# Patient Record
Sex: FEMALE | Race: WHITE | Employment: OTHER | URBAN - METROPOLITAN AREA
[De-identification: names, ages, dates, MRNs, and addresses within clinical notes are randomized per-mention and may not be internally consistent; named-entity substitution may affect disease eponyms.]

---

## 2017-10-19 ENCOUNTER — TRANSFERRED RECORDS (OUTPATIENT)
Dept: HEALTH INFORMATION MANAGEMENT | Facility: CLINIC | Age: 42
End: 2017-10-19

## 2017-10-19 ENCOUNTER — MEDICAL CORRESPONDENCE (OUTPATIENT)
Dept: HEALTH INFORMATION MANAGEMENT | Facility: CLINIC | Age: 42
End: 2017-10-19

## 2017-12-15 NOTE — TELEPHONE ENCOUNTER
Records Received From: [facility]    Date/Exam/Location  (specify location if different)   Office Notes: 10/19/17   Radiology Reports: XR Tailbone 10/19/17

## 2017-12-15 NOTE — TELEPHONE ENCOUNTER
Received Imaging From: [facility]    Image Type (x): Disc:_x__  Pacs:___      Exam Date/Name: XR Tailbone 10/19/17  MRI Pelvis 11/10/17 Comments: disc sent to [facility] via

## 2017-12-15 NOTE — TELEPHONE ENCOUNTER
APPT INFO    Date /Time: 3/15/18 10AM   Reason for Appt: Coccyndia   Ref Provider/Clinic: Dr. Winston Thakur   Are there internal records? Yes/No?  IF YES, list clinic names: NO   Are there outside records? Yes/No? Yes -- see below   Patient Contact (Y/N) & Call Details: No - pt is referred   Action: Records received -- forwarded to [facility]     OUTSIDE RECORDS CHECKLIST     CLINIC NAME COMMENTS REC (x) IMG (x)   [facility]  x x

## 2018-03-15 ENCOUNTER — PRE VISIT (OUTPATIENT)
Dept: ORTHOPEDICS | Facility: CLINIC | Age: 43
End: 2018-03-15

## 2018-08-20 ASSESSMENT — ENCOUNTER SYMPTOMS
SNORES LOUDLY: 1
MUSCLE CRAMPS: 1

## 2018-08-21 ENCOUNTER — HEALTH MAINTENANCE LETTER (OUTPATIENT)
Age: 43
End: 2018-08-21

## 2018-08-22 DIAGNOSIS — M53.3 PAIN IN THE COCCYX: Primary | ICD-10-CM

## 2018-08-23 ENCOUNTER — RADIANT APPOINTMENT (OUTPATIENT)
Dept: GENERAL RADIOLOGY | Facility: CLINIC | Age: 43
End: 2018-08-23
Attending: ORTHOPAEDIC SURGERY

## 2018-08-23 ENCOUNTER — OFFICE VISIT (OUTPATIENT)
Dept: ORTHOPEDICS | Facility: CLINIC | Age: 43
End: 2018-08-23

## 2018-08-23 VITALS — BODY MASS INDEX: 33.84 KG/M2 | WEIGHT: 215.6 LBS | HEIGHT: 67 IN

## 2018-08-23 DIAGNOSIS — M53.3 PAIN IN THE COCCYX: Primary | ICD-10-CM

## 2018-08-23 DIAGNOSIS — M53.3 PAIN IN THE COCCYX: ICD-10-CM

## 2018-08-23 NOTE — MR AVS SNAPSHOT
"              After Visit Summary   8/23/2018    Reinae J Bollschweiler    MRN: [id number]           Patient Information     Date Of Birth          1975        Visit Information        Provider Department      8/23/2018 1:00 PM Tk Zuniga MD [facility]        Today's Diagnoses     Pain in the coccyx    -  1       Follow-ups after your visit        Who to contact     Please call your clinic at [phone] to:    Ask questions about your health    Make or cancel appointments    Discuss your medicines    Learn about your test results    Speak to your doctor            Additional Information About Your Visit        MyChart Information     [employer] gives you secure access to your electronic health record. If you see a primary care provider, you can also send messages to your care team and make appointments. If you have questions, please call your primary care clinic.  If you do not have a primary care provider, please call [phone] and they will assist you.      [employer] is an electronic gateway that provides easy, online access to your medical records. With [employer], you can request a clinic appointment, read your test results, renew a prescription or communicate with your care team.     To access your existing account, please contact your [facility] or call [phone] for assistance.        Care EveryWhere ID     This is your Care EveryWhere ID. This could be used by other organizations to access your [place] medical records  [id number]        Your Vitals Were     Height BMI (Body Mass Index)                1.71 m (5' 7.32\") 33.44 kg/m2           Blood Pressure from Last 3 Encounters:   No data found for BP    Weight from Last 3 Encounters:   08/23/18 97.8 kg (215 lb 9.6 oz)              Today, you had the following     No orders found for display       Primary Care Provider    None Specified       No primary provider on file.        Equal Access to " Services     [facility]: Hadii luna Pagan, wamirda luqadaha, qaybta kaklevermitchell honeycutt, zackary joseph. So [facility] [phone].    ATENCIÓN: Si habla español, tiene a valladares disposición servicios gratuitos de asistencia lingüística. Llame al [phone].    We comply with applicable federal civil rights laws and [place] laws. We do not discriminate on the basis of race, color, national origin, age, disability, sex, sexual orientation, or gender identity.            Thank you!     Thank you for choosing [facility]  for your care. Our goal is always to provide you with excellent care. Hearing back from our patients is one way we can continue to improve our services. Please take a few minutes to complete the written survey that you may receive in the mail after your visit with us. Thank you!             Your Updated Medication List - Protect others around you: Learn how to safely use, store and throw away your medicines at www.disposemymeds.org.      Notice  As of 8/23/2018 11:59 PM    You have not been prescribed any medications.

## 2018-08-23 NOTE — LETTER
8/23/2018       RE: Reinae J Bollschweiler  [address]  [facility] [address]     Dear Colleague,    Thank you for referring your patient, Reinae J Bollschweiler, to the [facility]. Please see a copy of my visit note below.    Chief complaint: Coccyx pain    History of present illness: The patient is a 43-year-old female with past medical history including hypothyroidism presenting for evaluation of coccyx pain ×5 years.  The patient reports that it first started during her first pregnancy in 2013.  The pain got worse throughout her pregnancy but she felt that if she waited until after she delivered this would resolve.  She tried conservative measures for the year following her first child's delivery but unfortunately pain only got worse.  To complicate the picture she then slipped on a slide falling directly onto her bottom which aggravated her pain.  She points her coccyx as a source of her pain.  This is aggravated with sitting in certain positions particularly for long periods.  She drove from [place] today and is having significant pain as a result.  She does note that it sometimes radiates to her low back but does not radiate down her legs.  She denies any numbness or tingling or motor dysfunction or weakness distally.  She has no pain with walking or standing.  She takes ibuprofen for pain relief.  She is undergone at least for x-ray guided coccyx injections.  Her first injection provided with 100% pain relief for greater than 3 months.  Subsequent injections have provided her with 80% pain relief and last several months.  He does note that her sister has a history of a coccygectomy for coccydynia.    Past medical history:  1.  Hypothyroidism    Past surgical history:  1.  Cholecystectomy  2.  Hernia repair  3.  Knee arthroscopy    Medications:  1.  Levothyroxine  2.  Ibuprofen as needed    Allergies: No known drug allergies    Family  history: Patient denies family history of problems of bleeding, clotting or anesthesia.    Social history: The patient lives near [place], [place].  She lives with her  and children in her home.  She is a stay-at-home mom.  She is a never smoker.  Denies alcohol or illicit drug use.    Review of systems: 10 point review of systems is performed and negative other than that noted in the HPI and at the end of the note.    Physical exam:  Visual analog pain scale 4  Oswestry questionnaire 12  General: No acute distress, pleasant, cooperative with exam.  HEENT: Normocephalic atraumatic.  Cardio: Extremities warm and well-perfused.  Respiratory: Nonlabored breathing on room air.  Neuro: Patient moves all extremities spontaneously.  Psych: Appropriate affect.  Musculoskeletal: Upon standing the patient has no significant coronal or sagittal malalignment.  Sensation intact light touch in the L3 through S1 nerve distributions.  Patient fires EHL, ankle dorsi and plantar flexion, knee extension, hip flexion with 5 out of 5 strength.  She is nontender to palpation of the paraspinous muscles nor the spinous processes of the cervical thoracic or lumbar spine.  She is tender to palpation of her coccyx externally.  With the rectal examination, the patient is nontender to palpation of her sacrum but does have significant reproduction of her symptoms with stress of the coccyx.  Of note upon the rectal examination, her coccyx was also found to be posteriorly subluxated.  She has a negative GHAZAL, thigh thrust, sacral thrust.  She is nontender palpation of her bilateral greater trochanters.  She is nontender palpation of her bilateral sacroiliac joints.    Imaging:    X-rays of the patient's sacrum obtained today when she is sitting as compared to those obtained on October 19, 2017 when she is standing reveal 100% posterior subluxation of the coccyx to the remainder of the sacrum when sitting.    An MRI of the sacrum  obtained on November 10, 2017 is reviewed and reveals no evidence of significant degenerative disease or bony abnormality.    Assessment:   Coccydnia    Plan: We had a long discussion with the patient regarding her physical exam as well as clinical findings.  We discussed the diagnosis of coccydynia and the different treatment options to include lifestyle modification, over-the-counter medications, corticosteroid injections and finally coccygectomy.  She is exhausted all of her nonoperative management considerations and given her 100% posterior subluxation of the coccyx on a sitting x-ray as well as significant reproduction of her symptoms upon physical exam maneuvers today feel that she is well indicated for a coccygectomy.  We had a long discussion regarding the risks benefits and alternatives with particular attention paid to the risk of wound complications including infection.  She understands the limitations following the surgery and is interested in moving forward.  This will be scheduled at her convenience.  All questions were answered.    The patient was seen and discussed with Dr. Zuniga was in agreement the above assessment and plan.    Yaneth Mukherjee MD  Orthopedic Surgery Resident, PGY-4    I saw and evaluated the patient on the day of the visit and I formulated the plan.    Again, thank you for allowing me to participate in the care of your patient.      Sincerely,    Tk Zuniga MD

## 2018-08-23 NOTE — NURSING NOTE
"Reason For Visit:   Chief Complaint   Patient presents with     Pain     Coccydina. Referral from Melisa Thakur       Primary MD: No primary care provider on file.  Ref. MD: Dr. Thakur     ?  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  Work status?  Full time.  Date of injury: Pt states she doesn't remember ever injuring it. Pain started in 2013 when she was pregnant. Pain has gotten worse over time. 2015 She took a fall right onto her coccyx which increased pain even more.   Date of surgery: Previous History of Injections.   Type of surgery: NA  Smoker: No  Request smoking cessation information: No    Ht 1.71 m (5' 7.32\")  Wt 97.8 kg (215 lb 9.6 oz)  BMI 33.44 kg/m2    Pain Assessment  Patient Currently in Pain: Yes  Primary Pain Location: Coccyx  Pain Orientation: Right  Pain Descriptors: Aching, Sharp, Dull (pain comes and goes)  Alleviating Factors: Cartico steroid, NSAIDS (Last injection was in march. patient states these injections last 6-8 months. Injections help with pain. )  Aggravating Factors: Sitting, Standing, Bending, Squatting    Oswestry (DEANNE) Questionnaire    OSWESTRY DISABILITY INDEX 8/20/2018   Count 10   Sum 6   Oswestry Score (%) 12        Visual Analog Pain Scale  Back Pain Scale 0-10: 4  Right leg pain: 2.5  Left leg pain: 0    Promis 10 Assessment    PROMIS 10 8/20/2018   In general, would you say your health is: Good   In general, would you say your quality of life is: Good   In general, how would you rate your physical health? Good   In general, how would you rate your mental health, including your mood and your ability to think? Good   In general, how would you rate your satisfaction with your social activities and relationships? Good   In general, please rate how well you carry out your usual social activities and roles Good   To what extent are you able to carry out your everyday physical activities such as walking, climbing stairs, carrying groceries, or moving a chair? " Moderately   How often have you been bothered by emotional problems such as feeling anxious, depressed or irritable? Rarely   How would you rate your fatigue on average? Moderate   How would you rate your pain on average?   0 = No Pain  to  10 = Worst Imaginable Pain 2   In general, would you say your health is: 3   In general, would you say your quality of life is: 3   In general, how would you rate your physical health? 3   In general, how would you rate your mental health, including your mood and your ability to think? 3   In general, how would you rate your satisfaction with your social activities and relationships? 3   In general, please rate how well you carry out your usual social activities and roles. (This includes activities at home, at work and in your community, and responsibilities as a parent, child, spouse, employee, friend, etc.) 3   To what extent are you able to carry out your everyday physical activities such as walking, climbing stairs, carrying groceries, or moving a chair? 3   In the past 7 days, how often have you been bothered by emotional problems such as feeling anxious, depressed, or irritable? 2   In the past 7 days, how would you rate your fatigue on average? 3   In the past 7 days, how would you rate your pain on average, where 0 means no pain, and 10 means worst imaginable pain? 2   Global Mental Health Score 13   Global Physical Health Score 13   PROMIS TOTAL - SUBSCORES 26     Yaneth Shore ATC

## 2018-08-23 NOTE — PROGRESS NOTES
Chief complaint: Coccyx pain    History of present illness: The patient is a 43-year-old female with past medical history including hypothyroidism presenting for evaluation of coccyx pain ×5 years.  The patient reports that it first started during her first pregnancy in 2013.  The pain got worse throughout her pregnancy but she felt that if she waited until after she delivered this would resolve.  She tried conservative measures for the year following her first child's delivery but unfortunately pain only got worse.  To complicate the picture she then slipped on a slide falling directly onto her bottom which aggravated her pain.  She points her coccyx as a source of her pain.  This is aggravated with sitting in certain positions particularly for long periods.  She drove from [place] today and is having significant pain as a result.  She does note that it sometimes radiates to her low back but does not radiate down her legs.  She denies any numbness or tingling or motor dysfunction or weakness distally.  She has no pain with walking or standing.  She takes ibuprofen for pain relief.  She is undergone at least for x-ray guided coccyx injections.  Her first injection provided with 100% pain relief for greater than 3 months.  Subsequent injections have provided her with 80% pain relief and last several months.  He does note that her sister has a history of a coccygectomy for coccydynia.    Past medical history:  1.  Hypothyroidism    Past surgical history:  1.  Cholecystectomy  2.  Hernia repair  3.  Knee arthroscopy    Medications:  1.  Levothyroxine  2.  Ibuprofen as needed    Allergies: No known drug allergies    Family history: Patient denies family history of problems of bleeding, clotting or anesthesia.    Social history: The patient lives near [place], [place].  She lives with her  and children in her home.  She is a stay-at-home mom.  She is a never smoker.  Denies alcohol or illicit drug  use.    Review of systems: 10 point review of systems is performed and negative other than that noted in the HPI and at the end of the note.    Physical exam:  Visual analog pain scale 4  Oswestry questionnaire 12  General: No acute distress, pleasant, cooperative with exam.  HEENT: Normocephalic atraumatic.  Cardio: Extremities warm and well-perfused.  Respiratory: Nonlabored breathing on room air.  Neuro: Patient moves all extremities spontaneously.  Psych: Appropriate affect.  Musculoskeletal: Upon standing the patient has no significant coronal or sagittal malalignment.  Sensation intact light touch in the L3 through S1 nerve distributions.  Patient fires EHL, ankle dorsi and plantar flexion, knee extension, hip flexion with 5 out of 5 strength.  She is nontender to palpation of the paraspinous muscles nor the spinous processes of the cervical thoracic or lumbar spine.  She is tender to palpation of her coccyx externally.  With the rectal examination, the patient is nontender to palpation of her sacrum but does have significant reproduction of her symptoms with stress of the coccyx.  Of note upon the rectal examination, her coccyx was also found to be posteriorly subluxated.  She has a negative GHAZAL, thigh thrust, sacral thrust.  She is nontender palpation of her bilateral greater trochanters.  She is nontender palpation of her bilateral sacroiliac joints.    Imaging:    X-rays of the patient's sacrum obtained today when she is sitting as compared to those obtained on October 19, 2017 when she is standing reveal 100% posterior subluxation of the coccyx to the remainder of the sacrum when sitting.    An MRI of the sacrum obtained on November 10, 2017 is reviewed and reveals no evidence of significant degenerative disease or bony abnormality.    Assessment:   Coccydnia    Plan: We had a long discussion with the patient regarding her physical exam as well as clinical findings.  We discussed the diagnosis of  coccydynia and the different treatment options to include lifestyle modification, over-the-counter medications, corticosteroid injections and finally coccygectomy.  She is exhausted all of her nonoperative management considerations and given her 100% posterior subluxation of the coccyx on a sitting x-ray as well as significant reproduction of her symptoms upon physical exam maneuvers today feel that she is well indicated for a coccygectomy.  We had a long discussion regarding the risks benefits and alternatives with particular attention paid to the risk of wound complications including infection.  She understands the limitations following the surgery and is interested in moving forward.  This will be scheduled at her convenience.  All questions were answered.    The patient was seen and discussed with Dr. Zuniga was in agreement the above assessment and plan.    Yaneth Mukherjee MD  Orthopedic Surgery Resident, PGY-4    I saw and evaluated the patient on the day of the visit and I formulated the plan.  Tk Zuniga MD      Answers for HPI/ROS submitted by the patient on 8/20/2018   General Symptoms: No  Skin Symptoms: No  HENT Symptoms: No  EYE SYMPTOMS: No  HEART SYMPTOMS: No  LUNG SYMPTOMS: Yes  INTESTINAL SYMPTOMS: No  URINARY SYMPTOMS: No  GYNECOLOGIC SYMPTOMS: No  BREAST SYMPTOMS: No  SKELETAL SYMPTOMS: Yes  BLOOD SYMPTOMS: No  NERVOUS SYSTEM SYMPTOMS: No  MENTAL HEALTH SYMPTOMS: No  Snoring: Yes  Muscle cramps: Yes  PHQ-2 Score: Incomplete    Answers for HPI/ROS submitted by the patient on 8/20/2018   General Symptoms: No  Skin Symptoms: No  HENT Symptoms: No  EYE SYMPTOMS: No  HEART SYMPTOMS: No  LUNG SYMPTOMS: Yes  INTESTINAL SYMPTOMS: No  URINARY SYMPTOMS: No  GYNECOLOGIC SYMPTOMS: No  BREAST SYMPTOMS: No  SKELETAL SYMPTOMS: Yes  BLOOD SYMPTOMS: No  NERVOUS SYSTEM SYMPTOMS: No  MENTAL HEALTH SYMPTOMS: No  Snoring: Yes  Muscle cramps: Yes  PHQ-2 Score: Incomplete

## 2018-08-23 NOTE — LETTER
8/23/2018      RE: Marilee CONSTANCE Webberchweiler  [address]  [facility] [address]       Chief complaint: Coccyx pain    History of present illness: The patient is a 43-year-old female with past medical history including hypothyroidism presenting for evaluation of coccyx pain ×5 years.  The patient reports that it first started during her first pregnancy in 2013.  The pain got worse throughout her pregnancy but she felt that if she waited until after she delivered this would resolve.  She tried conservative measures for the year following her first child's delivery but unfortunately pain only got worse.  To complicate the picture she then slipped on a slide falling directly onto her bottom which aggravated her pain.  She points her coccyx as a source of her pain.  This is aggravated with sitting in certain positions particularly for long periods.  She drove from [place] today and is having significant pain as a result.  She does note that it sometimes radiates to her low back but does not radiate down her legs.  She denies any numbness or tingling or motor dysfunction or weakness distally.  She has no pain with walking or standing.  She takes ibuprofen for pain relief.  She is undergone at least for x-ray guided coccyx injections.  Her first injection provided with 100% pain relief for greater than 3 months.  Subsequent injections have provided her with 80% pain relief and last several months.  He does note that her sister has a history of a coccygectomy for coccydynia.    Past medical history:  1.  Hypothyroidism    Past surgical history:  1.  Cholecystectomy  2.  Hernia repair  3.  Knee arthroscopy    Medications:  1.  Levothyroxine  2.  Ibuprofen as needed    Allergies: No known drug allergies    Family history: Patient denies family history of problems of bleeding, clotting or anesthesia.    Social history: The patient lives near [place], [place].  She lives with her  and children in her home.  She is  a stay-at-home mom.  She is a never smoker.  Denies alcohol or illicit drug use.    Review of systems: 10 point review of systems is performed and negative other than that noted in the HPI and at the end of the note.    Physical exam:  Visual analog pain scale 4  Oswestry questionnaire 12  General: No acute distress, pleasant, cooperative with exam.  HEENT: Normocephalic atraumatic.  Cardio: Extremities warm and well-perfused.  Respiratory: Nonlabored breathing on room air.  Neuro: Patient moves all extremities spontaneously.  Psych: Appropriate affect.  Musculoskeletal: Upon standing the patient has no significant coronal or sagittal malalignment.  Sensation intact light touch in the L3 through S1 nerve distributions.  Patient fires EHL, ankle dorsi and plantar flexion, knee extension, hip flexion with 5 out of 5 strength.  She is nontender to palpation of the paraspinous muscles nor the spinous processes of the cervical thoracic or lumbar spine.  She is tender to palpation of her coccyx externally.  With the rectal examination, the patient is nontender to palpation of her sacrum but does have significant reproduction of her symptoms with stress of the coccyx.  Of note upon the rectal examination, her coccyx was also found to be posteriorly subluxated.  She has a negative GHAZAL, thigh thrust, sacral thrust.  She is nontender palpation of her bilateral greater trochanters.  She is nontender palpation of her bilateral sacroiliac joints.    Imaging:    X-rays of the patient's sacrum obtained today when she is sitting as compared to those obtained on October 19, 2017 when she is standing reveal 100% posterior subluxation of the coccyx to the remainder of the sacrum when sitting.    An MRI of the sacrum obtained on November 10, 2017 is reviewed and reveals no evidence of significant degenerative disease or bony abnormality.    Assessment:   Coccydnia    Plan: We had a long discussion with the patient regarding her  physical exam as well as clinical findings.  We discussed the diagnosis of coccydynia and the different treatment options to include lifestyle modification, over-the-counter medications, corticosteroid injections and finally coccygectomy.  She is exhausted all of her nonoperative management considerations and given her 100% posterior subluxation of the coccyx on a sitting x-ray as well as significant reproduction of her symptoms upon physical exam maneuvers today feel that she is well indicated for a coccygectomy.  We had a long discussion regarding the risks benefits and alternatives with particular attention paid to the risk of wound complications including infection.  She understands the limitations following the surgery and is interested in moving forward.  This will be scheduled at her convenience.  All questions were answered.    The patient was seen and discussed with Dr. Zuniga was in agreement the above assessment and plan.    Yaneth Mukherjee MD  Orthopedic Surgery Resident, PGY-4    I saw and evaluated the patient on the day of the visit and I formulated the plan.  Tk Zuniga MD

## 2018-10-05 ENCOUNTER — TELEPHONE (OUTPATIENT)
Dept: ORTHOPEDICS | Facility: CLINIC | Age: 43
End: 2018-10-05

## 2018-10-05 NOTE — TELEPHONE ENCOUNTER
[facility] Call Center    Phone Message    May a detailed message be left on voicemail: no    Reason for Call: Other: Pt saw Dr. Zuniga in August to discuss getting part of her tailbone removed.  Pt was told they would check with her insurance to see if surgery would be covered and pt has not heard anything back.  Pt is calling for a status update please     Action Taken: Message routed to:  [facility] ([facility] Ortho

## 2018-10-09 NOTE — TELEPHONE ENCOUNTER
Phoned patient to discuss scheduling surgery with Dr. Zuniga. Patient was provided several available surgery dates, stated she will check with  and call back with a decision. Patient was provided direct number for surgery scheduling. [phone].

## 2018-11-28 ENCOUNTER — TRANSFERRED RECORDS (OUTPATIENT)
Dept: HEALTH INFORMATION MANAGEMENT | Facility: CLINIC | Age: 43
End: 2018-11-28

## 2018-12-11 ENCOUNTER — DOCUMENTATION ONLY (OUTPATIENT)
Dept: ORTHOPEDICS | Facility: CLINIC | Age: 43
End: 2018-12-11

## 2018-12-11 ENCOUNTER — HOSPITAL ENCOUNTER (OUTPATIENT)
Facility: CLINIC | Age: 43
End: 2018-12-11
Attending: ORTHOPAEDIC SURGERY | Admitting: ORTHOPAEDIC SURGERY
Payer: COMMERCIAL

## 2018-12-11 NOTE — PROGRESS NOTES
Patient is scheduled for surgery with Dr. Zuniga      Spoke or left message with: Patient    Date of Surgery: 12/19/18    Location: [place] (Dr. Zuniga at [place] same day doing another co-surgeon case)    Pre-op with surgeon (if applicable): Complete    H&P: Patient to schedule with PCP    Additional imaging/appointments: N/A    Surgery packet: Received in clinic     Additional comments: N/A

## 2018-12-12 ENCOUNTER — TELEPHONE (OUTPATIENT)
Dept: ORTHOPEDICS | Facility: CLINIC | Age: 43
End: 2018-12-12

## 2018-12-12 NOTE — TELEPHONE ENCOUNTER
Phoned patient to let her know that her surgery with Dr. Zuniga has been moved from the [facility] to the [facility] in order to accommodate Dr. Zuniga's schedule, as he will be involved in another large surgery that day on the [place]. Patient agreed to the change.

## 2018-12-17 RX ORDER — FERROUS SULFATE 324(65)MG
TABLET, DELAYED RELEASE (ENTERIC COATED) ORAL
COMMUNITY
End: 2018-12-18 | Stop reason: HOSPADM

## 2018-12-17 RX ORDER — THYROID 30 MG/1
90 TABLET ORAL DAILY
COMMUNITY
End: 2018-12-18 | Stop reason: HOSPADM

## 2018-12-17 SDOH — HEALTH STABILITY: MENTAL HEALTH: HOW OFTEN DO YOU HAVE A DRINK CONTAINING ALCOHOL?: NEVER

## 2018-12-17 NOTE — OR NURSING
Contacted Hnoey HAILE Re: patient's hemoglobin results from pre-op Hand P and repeat on 12/14/18 (9.6).See labs tab. Honey stated that she would check with Dr. Zuniga and get back to Pre-admission nursing.

## 2018-12-18 ENCOUNTER — TELEPHONE (OUTPATIENT)
Dept: ORTHOPEDICS | Facility: CLINIC | Age: 43
End: 2018-12-18

## 2018-12-18 DIAGNOSIS — M53.3 COCCYDYNIA: Primary | ICD-10-CM

## 2018-12-18 DIAGNOSIS — M53.3 COCCYDYNIA: ICD-10-CM

## 2018-12-18 LAB — HGB BLD-MCNC: 9.6 G/DL (ref 11.7–15.7)

## 2018-12-18 RX ORDER — CEFAZOLIN SODIUM 2 G/100ML
2 INJECTION, SOLUTION INTRAVENOUS
Status: CANCELLED | OUTPATIENT
Start: 2018-12-18

## 2018-12-18 RX ORDER — CEFAZOLIN SODIUM 1 G/3ML
1 INJECTION, POWDER, FOR SOLUTION INTRAMUSCULAR; INTRAVENOUS SEE ADMIN INSTRUCTIONS
Status: CANCELLED | OUTPATIENT
Start: 2018-12-18

## 2018-12-18 NOTE — TELEPHONE ENCOUNTER
PAN called last night about H&P results faxed from primary at home-see scan in epic.  A1C 6.2 started on pre Diabetes work up by primary MD &  Hgb 9.3 started on Iron supp. & rechecked Fri Dec 14 was 9.6.   I called pt. Last night 12-17-18 & she & her  were already driving here from [place] last night.  She  stated Hx. Anemia her whole life & they have never found a cause, even after childbirth.  Stated she had her period on fri. When Hgb rechecked & really wants surgery still done in Dec due to Insurance deductible being  met or else it will be another whole year before she can have the surgery.  I reviewed with  who stated  He would be willing to do the surgery if Hgb 10.0 or above & to be sure pt understands the increased risk of medical complications if Low Hgb.  Pt. Agreed.  Recheck Hgb today here at [facility].    T.O.R.B./Honey Maki RN.    See my previous note of today 12-18-18.  Recheck Hgb today the same 9.6 so surgery canceled for tomorrow.  Informed pt. & again recommended she get referral order from primary MD for consult by hematology at home for workup of cause of chronic Anemia.  I asked pt. To sign CONSTANCE & have imaging & workup sent to us &  Pt. Agreed.  Pt. Knows not to call [facility] surgery scheduler to pick new surgery date until  We get Fax of Hgb 10.0 or higher. Pt agreed.  Notified Charity.  T.O./Honey Maki RN.

## 2019-01-03 ENCOUNTER — TELEPHONE (OUTPATIENT)
Dept: ORTHOPEDICS | Facility: CLINIC | Age: 44
End: 2019-01-03

## 2019-01-03 NOTE — TELEPHONE ENCOUNTER
Patient is calling to inform Honey HAILE RNCC that her PCP will be faxing over her new labs so that she can be scheduled for surgery. I will notify Honey HAILE To watch for that update and to call patient once it arrives. Patient stated that she lives in [place] and would rather not come back for another visit unless she absolutely needs to. She would just like to set up a surgical date as soon as possible.

## 2019-02-18 NOTE — PROGRESS NOTES
REASON FOR CONSULTATION: Consult (Dr. Zuniga pt. Discuss a Coccygectomy)     REFERRING PHYSICIAN: Referred Self   PCP:No primary care provider on file.    History of Present Illness:    Reinae J Bollschweiler is a 43 year old female with a past medical history significant for hypothyroidism, anemia who initially presented to Dr. Zuniga's office for 5 years of coccyx pain.  Of note, her sister has a history of coccygectomy for coccydynia. She reported that she first developed coccyx pain during her first pregnancy in 2013.  The pain worsened throughout the duration of her pregnancy, and continued to increase in severity following the delivery of her child.  Her pain is aggravated with sitting in certain positions particularly for long periods of time.  She denies any numbness/tingling, motor dysfunction or weakness distally.      She was last seen by Dr. Zuniga on 8/23/2018.  During that visit they discussed her imaging which demonstrated 100% posterior subluxation of the coccyx on a standing x-ray, as well as, her physical exam findings including reproduction of her symptoms with physical exam maneuvers.    Patient was initially scheduled for coccygectomy on 12/19/2018 however her procedure was canceled secondary to low hemoglobin.  She is presenting to our clinic today to discuss surgical scheduling as soon as possible.    Dr. Jama from hematology determined that her anemia is secondary to her period and she is now on daily Iron supplement.     Today, she reports that her coccyx pain is manageable secondary to her recent injection.  She does acknowledge that her pain has changed compared to her last visit with Dr. Zuniga.  She reports that previously, her pain would only affect her when she was transitioning from sitting to standing or vice versa.  Unfortunately, now she is beginning to have symptoms with prolonged sitting.  Additionally, her injection is not as effective as it had been in the past.  She denies  "anything outside of the injections that have improved her pain.  She denies any radiation to her legs.    Back 100%, Leg 0%  Worse: prolonged sitting or transitioning from sitting to standing  Better: worse    Conservative treatments:  1.  Medications - ibuprofen   2.  Injections -she reports significant pain relief for several months following coccyx injections. Last injection at [facility], December, 75% improvement.  Patient reports that her injection is still providing her 75% improvement in her pain.  Typically, her injections last for approximately 5 months.      Oswestry (DEANNE) Questionnaire    OSWESTRY DISABILITY INDEX 2/21/2019   Count 8   Sum 6   Oswestry Score (%) 15      ROS:  A 12-point review of systems was completed and is negative except for otherwise noted above in the history of present illness.    Med Hx:  No past medical history on file.    Surg Hx:  Past Surgical History:   Procedure Laterality Date     CHOLECYSTECTOMY       GYN SURGERY      D and C     ORTHOPEDIC SURGERY Left     knee surgery     TONSILLECTOMY         Allergies:  No Known Allergies    Meds:  Current Outpatient Medications   Medication     ferrous sulfate (FEROSUL) 325 (65 Fe) MG tablet     thyroid (ARMOUR THYROID) 120 MG tablet     No current facility-administered medications for this visit.        Fam Hx:  No family history on file.    P/S Hx:  Social History     Tobacco Use     Smoking status: Never Smoker     Smokeless tobacco: Never Used   Substance Use Topics     Alcohol use: No     Frequency: Never         Physical Exam:  Very pleasant, healthy appearing, alert, oriented x 3, cooperative.  Normal mood and affect.  Not in cardiorespiratory distress.  Ht 1.702 m (5' 7\")   Wt 99.8 kg (220 lb)   BMI 34.46 kg/m    Normal upright posture.    Normal gait without assistive device.  No antalgia / imbalance.  Able to walk on toes and on heels with ease.  Back: no deformity, no skin lesions or surgical " scars.  Localizes pain at coccyx  Tenderness: (+) midline, (-) paraspinal, (-) R and L PSIS.    Neuro Exam:  Motor: 5/5 strength for all muscle groups in both LE's.  Sensory:  Intact to light touch in both LE's.   Reflexes:  Knee 2+ bilat.  Ankle 2+ bilat.  (-) Babinski, (-) clonus.    Lower Extremity:  Equal leg lengths, full pulses, (-) atrophy / asymmetry.  Full painless passive knee and ankle motion.  Straight leg raise: (-) right, (-) left.    Imaging:  Reviewed previous sacrum/coccyx lateral x-ray and pelvis MRI, which show clear change in sacrococcygeal joint alignment between the stress sitting x-ray and the supine MRI.  Interestingly, the coccyx is more posteriorly translated on the sitting x-ray and is well-aligned (neutral) on the supine MRI.    Impression:   Chronic coccygodynia, with sacrococcygeal joint instability (documented on imaging) and with positive response to previous multiple sacrococcygeal joint injection.     Plan:   Schedule for coccygectomy   Will follow up on Pre-op hemoglobin in order to prevent a repeat of her prior surgical experience    Barrera Borrego MD  Orthopaedic Surgery, PGY-1    All questions and concerns were answered to the patient's apparent satisfaction before leaving the clinic.     Attestation:  I (Dr. Chandler Hart - Spine Surgeon) have personally evaluated patient with PGY-1 Elmo, and agree with findings and plan outlined in the note.  I discussed at length with the patient/family, explained the nature of spinal condition, and formulated workup and/or treatment plan together.  All questions were answered to the best of my ability and to patient's apparent satisfaction.    TT > 25 mins, > 50% CC.    Respectfully,    Chandler Hart MD    Orthopaedic Spine Surgery  Dept Orthopaedic Surgery, [facility]  [phone] office, [phone] pager  [email or web address]      Answers for HPI/ROS submitted by the patient on 2/21/2019    General Symptoms: No  Skin Symptoms: No  HENT Symptoms: No  EYE SYMPTOMS: No  HEART SYMPTOMS: No  LUNG SYMPTOMS: No  INTESTINAL SYMPTOMS: No  URINARY SYMPTOMS: No  GYNECOLOGIC SYMPTOMS: No  BREAST SYMPTOMS: No  SKELETAL SYMPTOMS: No  BLOOD SYMPTOMS: No  NERVOUS SYSTEM SYMPTOMS: No  MENTAL HEALTH SYMPTOMS: No

## 2019-02-21 ENCOUNTER — PRE VISIT (OUTPATIENT)
Dept: ORTHOPEDICS | Facility: CLINIC | Age: 44
End: 2019-02-21

## 2019-02-21 ENCOUNTER — OFFICE VISIT (OUTPATIENT)
Dept: ORTHOPEDICS | Facility: CLINIC | Age: 44
End: 2019-02-21
Payer: COMMERCIAL

## 2019-02-21 VITALS — BODY MASS INDEX: 34.53 KG/M2 | HEIGHT: 67 IN | WEIGHT: 220 LBS

## 2019-02-21 DIAGNOSIS — M53.3 COCCYGODYNIA: Primary | ICD-10-CM

## 2019-02-21 RX ORDER — THYROID 120 MG/1
TABLET ORAL
COMMUNITY

## 2019-02-21 RX ORDER — FERROUS SULFATE 325(65) MG
325 TABLET ORAL
COMMUNITY
End: 2019-06-05

## 2019-02-21 ASSESSMENT — MIFFLIN-ST. JEOR: SCORE: 1680.54

## 2019-02-21 NOTE — NURSING NOTE
Teaching Flowsheet   Relevant Diagnosis: Coccyx   Teaching Topic: preop     Person(s) involved in teaching:   Patient     Motivation Level:  Asks Questions: Yes  Eager to Learn: Yes  Cooperative: Yes  Receptive (willing/able to accept information): Yes  Any cultural factors/Faith beliefs that may influence understanding or compliance? No       Patient demonstrates understanding of the following:  Reason for the appointment, diagnosis and treatment plan: Yes  Knowledge of proper use of medications and conditions for which they are ordered (with special attention to potential side effects or drug interactions): Yes  Which situations necessitate calling provider and whom to contact: Yes       Teaching Concerns Addressed:        Proper use and care of meds. (medical equip, care aids, etc.): Yes  Nutritional needs and diet plan: Yes  Pain management techniques: Yes  Wound Care: Yes  How and/when to access community resources: Yes     Instructional Materials Used/Given: preop pkt     Time spent with patient: 15 minutes.

## 2019-02-21 NOTE — LETTER
2/21/2019       RE: Reinae J Bollschweiler  [address]  [place] [address]     Dear Colleague,    Thank you for referring your patient, Reinae J Bollschweiler, to [facility]. Please see a copy of my visit note below.    REASON FOR CONSULTATION: Consult (Dr. Zuniga pt. Discuss a Coccygectomy)     REFERRING PHYSICIAN: Referred Self   PCP:No primary care provider on file.    History of Present Illness:    Reinae J Bollschweiler is a 43 year old female with a past medical history significant for hypothyroidism, anemia who initially presented to Dr. Zuniga's office for 5 years of coccyx pain.  Of note, her sister has a history of coccygectomy for coccydynia. She reported that she first developed coccyx pain during her first pregnancy in 2013.  The pain worsened throughout the duration of her pregnancy, and continued to increase in severity following the delivery of her child.  Her pain is aggravated with sitting in certain positions particularly for long periods of time.  She denies any numbness/tingling, motor dysfunction or weakness distally.      She was last seen by Dr. Zuniga on 8/23/2018.  During that visit they discussed her imaging which demonstrated 100% posterior subluxation of the coccyx on a standing x-ray, as well as, her physical exam findings including reproduction of her symptoms with physical exam maneuvers.    Patient was initially scheduled for coccygectomy on 12/19/2018 however her procedure was canceled secondary to low hemoglobin.  She is presenting to our clinic today to discuss surgical scheduling as soon as possible.    Dr. Jama from hematology determined that her anemia is secondary to her period and she is now on daily Iron supplement.     Today, she reports that her coccyx pain is manageable secondary to her recent injection.  She does acknowledge that her pain has changed compared to her last visit with Dr. Zuniga.  She reports  that previously, her pain would only affect her when she was transitioning from sitting to standing or vice versa.  Unfortunately, now she is beginning to have symptoms with prolonged sitting.  Additionally, her injection is not as effective as it had been in the past.  She denies anything outside of the injections that have improved her pain.  She denies any radiation to her legs.    Back 100%, Leg 0%  Worse: prolonged sitting or transitioning from sitting to standing  Better: worse    Conservative treatments:  1.  Medications - ibuprofen   2.  Injections -she reports significant pain relief for several months following coccyx injections. Last injection at [facility], December, 75% improvement.  Patient reports that her injection is still providing her 75% improvement in her pain.  Typically, her injections last for approximately 5 months.    Oswestry (DEANNE) Questionnaire    OSWESTRY DISABILITY INDEX 2/21/2019   Count 8   Sum 6   Oswestry Score (%) 15      ROS:  A 12-point review of systems was completed and is negative except for otherwise noted above in the history of present illness.    Med Hx:  No past medical history on file.    Surg Hx:  Past Surgical History:   Procedure Laterality Date     CHOLECYSTECTOMY       GYN SURGERY      D and C     ORTHOPEDIC SURGERY Left     knee surgery     TONSILLECTOMY         Allergies:  No Known Allergies    Meds:  Current Outpatient Medications   Medication     ferrous sulfate (FEROSUL) 325 (65 Fe) MG tablet     thyroid (ARMOUR THYROID) 120 MG tablet     No current facility-administered medications for this visit.        Fam Hx:  No family history on file.    P/S Hx:  Social History     Tobacco Use     Smoking status: Never Smoker     Smokeless tobacco: Never Used   Substance Use Topics     Alcohol use: No     Frequency: Never       Physical Exam:  Very pleasant, healthy appearing, alert, oriented x 3, cooperative.  Normal mood and affect.  Not in  "cardiorespiratory distress.  Ht 1.702 m (5' 7\")   Wt 99.8 kg (220 lb)   BMI 34.46 kg/m     Normal upright posture.    Normal gait without assistive device.  No antalgia / imbalance.  Able to walk on toes and on heels with ease.  Back: no deformity, no skin lesions or surgical scars.  Localizes pain at coccyx  Tenderness: (+) midline, (-) paraspinal, (-) R and L PSIS.    Neuro Exam:  Motor: 5/5 strength for all muscle groups in both LE's.  Sensory:  Intact to light touch in both LE's.   Reflexes:  Knee 2+ bilat.  Ankle 2+ bilat.  (-) Babinski, (-) clonus.    Lower Extremity:  Equal leg lengths, full pulses, (-) atrophy / asymmetry.  Full painless passive knee and ankle motion.  Straight leg raise: (-) right, (-) left.    Imaging:  Reviewed previous sacrum/coccyx lateral x-ray and pelvis MRI, which show clear change in sacrococcygeal joint alignment between the stress sitting x-ray and the supine MRI.  Interestingly, the coccyx is more posteriorly translated on the sitting x-ray and is well-aligned (neutral) on the supine MRI.    Impression:   Chronic coccygodynia, with sacrococcygeal joint instability (documented on imaging) and with positive response to previous multiple sacrococcygeal joint injection.     Plan:   Schedule for coccygectomy   Will follow up on Pre-op hemoglobin in order to prevent a repeat of her prior surgical experience    Barrera Borrego MD  Orthopaedic Surgery, PGY-1    All questions and concerns were answered to the patient's apparent satisfaction before leaving the clinic.     Attestation:  I (Dr. Chandler Hart - Spine Surgeon) have personally evaluated patient with PGY-1 Elmo, and agree with findings and plan outlined in the note.  I discussed at length with the patient/family, explained the nature of spinal condition, and formulated workup and/or treatment plan together.  All questions were answered to the best of my ability and to patient's apparent satisfaction.    TT > 25 " mins, > 50% CC.    Respectfully,    Chandler Hart MD    Orthopaedic Spine Surgery  Dept Orthopaedic Surgery, [facility]  [phone] office, [phone] pager  [email or web address]

## 2019-02-21 NOTE — NURSING NOTE
"Reason For Visit:   Chief Complaint   Patient presents with     Consult     Dr. Zuniga pt. Discuss a Coccygectomy        Primary MD: No primary care provider on file.  Ref. MD: Dr. Thakur           ?  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  Work status?  Full time.  Date of injury: Pt states she doesn't remember ever injuring it. Pain started in 2013 when she was pregnant. Pain has gotten worse over time. 2015 She took a fall right onto her coccyx which increased pain even more.   Date of surgery: Previous History of Injections.   Type of surgery: NA  Smoker: No    Ht 1.702 m (5' 7\")   Wt 99.8 kg (220 lb)   BMI 34.46 kg/m      Pain Assessment  Patient Currently in Pain: Denies    Oswestry (DEANNE) Questionnaire    OSWESTRY DISABILITY INDEX 2/21/2019   Count 8   Sum 6   Oswestry Score (%) 15        Visual Analog Pain Scale  Back Pain Scale 0-10: 0  Right leg pain: 0  Left leg pain: 0    Promis 10 Assessment    PROMIS 10 2/21/2019   In general, would you say your health is: Good   In general, would you say your quality of life is: Good   In general, how would you rate your physical health? Fair   In general, how would you rate your mental health, including your mood and your ability to think? Good   In general, how would you rate your satisfaction with your social activities and relationships? Good   In general, please rate how well you carry out your usual social activities and roles Good   To what extent are you able to carry out your everyday physical activities such as walking, climbing stairs, carrying groceries, or moving a chair? Moderately   How often have you been bothered by emotional problems such as feeling anxious, depressed or irritable? Never   How would you rate your fatigue on average? Mild   How would you rate your pain on average?   0 = No Pain  to  10 = Worst Imaginable Pain 0   In general, would you say your health is: 3   In general, would you say your quality of life is: 3   In " general, how would you rate your physical health? 2   In general, how would you rate your mental health, including your mood and your ability to think? 3   In general, how would you rate your satisfaction with your social activities and relationships? 3   In general, please rate how well you carry out your usual social activities and roles. (This includes activities at home, at work and in your community, and responsibilities as a parent, child, spouse, employee, friend, etc.) 3   To what extent are you able to carry out your everyday physical activities such as walking, climbing stairs, carrying groceries, or moving a chair? 3   In the past 7 days, how often have you been bothered by emotional problems such as feeling anxious, depressed, or irritable? 1   In the past 7 days, how would you rate your fatigue on average? 2   In the past 7 days, how would you rate your pain on average, where 0 means no pain, and 10 means worst imaginable pain? 0   Global Mental Health Score 14   Global Physical Health Score 14   PROMIS TOTAL - SUBSCORES 28                Nan Del Toro LPN

## 2019-03-19 ENCOUNTER — TELEPHONE (OUTPATIENT)
Dept: ORTHOPEDICS | Facility: CLINIC | Age: 44
End: 2019-03-19

## 2019-03-19 NOTE — TELEPHONE ENCOUNTER
Attempted to reach out to patient to assist with scheduling surgery with Dr. Hart. Left detailed message informing patient that we received prior auth approval and Dr. Pedroza first available would be 4/1, 4/9 or 4/30. Left best call back number of [phone]

## 2019-03-21 ENCOUNTER — HOSPITAL ENCOUNTER (OUTPATIENT)
Facility: AMBULATORY SURGERY CENTER | Age: 44
End: 2019-03-21
Attending: ORTHOPAEDIC SURGERY
Payer: COMMERCIAL

## 2019-03-21 NOTE — TELEPHONE ENCOUNTER
Patient is scheduled for surgery with Dr. Hart      Spoke or left message with: Spoke with Marilee    Date of Surgery: 6/11/19    [facility]    Informed patient they will need an adult  Yes    H&P: Patient to schedule with PCP    Additional imaging/appointments: N/A    Surgery packet: Given in clinic    Additional comments: Patient will await pre admission phone call 1-2 days prior to surgery for arrival time

## 2019-06-07 ENCOUNTER — TELEPHONE (OUTPATIENT)
Dept: ORTHOPEDICS | Facility: CLINIC | Age: 44
End: 2019-06-07

## 2019-06-07 NOTE — TELEPHONE ENCOUNTER
Attempted to reach out to patient to discuss her surgery moving from [facility] to [facility] due to an emergency add on. Left best call back number of [phone]

## 2019-06-09 PROBLEM — Z98.890 S/P BLADDER REPAIR: Status: ACTIVE | Noted: 2019-02-27

## 2019-06-09 PROBLEM — Z90.710 S/P LAPAROSCOPIC HYSTERECTOMY: Status: ACTIVE | Noted: 2019-02-27

## 2019-06-10 ENCOUNTER — ANESTHESIA EVENT (OUTPATIENT)
Dept: SURGERY | Facility: CLINIC | Age: 44
End: 2019-06-10
Payer: COMMERCIAL

## 2019-06-11 ENCOUNTER — ANESTHESIA (OUTPATIENT)
Dept: SURGERY | Facility: CLINIC | Age: 44
End: 2019-06-11
Payer: COMMERCIAL

## 2019-06-11 ENCOUNTER — HOSPITAL ENCOUNTER (OUTPATIENT)
Facility: CLINIC | Age: 44
Discharge: HOME | End: 2019-06-11
Attending: ORTHOPAEDIC SURGERY | Admitting: ORTHOPAEDIC SURGERY
Payer: COMMERCIAL

## 2019-06-11 VITALS
RESPIRATION RATE: 16 BRPM | WEIGHT: 229.94 LBS | HEART RATE: 84 BPM | HEIGHT: 67 IN | BODY MASS INDEX: 36.09 KG/M2 | SYSTOLIC BLOOD PRESSURE: 116 MMHG | TEMPERATURE: 98 F | OXYGEN SATURATION: 95 % | DIASTOLIC BLOOD PRESSURE: 64 MMHG

## 2019-06-11 DIAGNOSIS — M53.3 COCCYGODYNIA: Primary | ICD-10-CM

## 2019-06-11 LAB
ABO + RH BLD: NORMAL
ABO + RH BLD: NORMAL
BLD GP AB SCN SERPL QL: NORMAL
BLOOD BANK CMNT PATIENT-IMP: NORMAL
GLUCOSE BLDC GLUCOMTR-MCNC: 108 MG/DL (ref 70–99)
HGB BLD-MCNC: 14.4 G/DL (ref 11.7–15.7)
SPECIMEN EXP DATE BLD: NORMAL

## 2019-06-11 PROCEDURE — 25000566 ZZH SEVOFLURANE, EA 15 MIN: Performed by: ORTHOPAEDIC SURGERY

## 2019-06-11 PROCEDURE — 25000131 ZZH RX MED GY IP 250 OP 636 PS 637: Performed by: PHYSICIAN ASSISTANT

## 2019-06-11 PROCEDURE — 25800030 ZZH RX IP 258 OP 636: Performed by: ANESTHESIOLOGY

## 2019-06-11 PROCEDURE — 36415 COLL VENOUS BLD VENIPUNCTURE: CPT | Performed by: ANESTHESIOLOGY

## 2019-06-11 PROCEDURE — 25000125 ZZHC RX 250: Performed by: NURSE ANESTHETIST, CERTIFIED REGISTERED

## 2019-06-11 PROCEDURE — 25000128 H RX IP 250 OP 636: Performed by: ANESTHESIOLOGY

## 2019-06-11 PROCEDURE — 25000128 H RX IP 250 OP 636: Performed by: PHYSICIAN ASSISTANT

## 2019-06-11 PROCEDURE — 25000132 ZZH RX MED GY IP 250 OP 250 PS 637: Performed by: PHYSICIAN ASSISTANT

## 2019-06-11 PROCEDURE — 86850 RBC ANTIBODY SCREEN: CPT | Performed by: ANESTHESIOLOGY

## 2019-06-11 PROCEDURE — 85018 HEMOGLOBIN: CPT | Performed by: ANESTHESIOLOGY

## 2019-06-11 PROCEDURE — 36000064 ZZH SURGERY LEVEL 4 EA 15 ADDTL MIN - UMMC: Performed by: ORTHOPAEDIC SURGERY

## 2019-06-11 PROCEDURE — 27210794 ZZH OR GENERAL SUPPLY STERILE: Performed by: ORTHOPAEDIC SURGERY

## 2019-06-11 PROCEDURE — 37000009 ZZH ANESTHESIA TECHNICAL FEE, EACH ADDTL 15 MIN: Performed by: ORTHOPAEDIC SURGERY

## 2019-06-11 PROCEDURE — 71000013 ZZH RECOVERY PHASE 1 LEVEL 1 EA ADDTL HR: Performed by: ORTHOPAEDIC SURGERY

## 2019-06-11 PROCEDURE — 86901 BLOOD TYPING SEROLOGIC RH(D): CPT | Performed by: ANESTHESIOLOGY

## 2019-06-11 PROCEDURE — 71000027 ZZH RECOVERY PHASE 2 EACH 15 MINS: Performed by: ORTHOPAEDIC SURGERY

## 2019-06-11 PROCEDURE — 86900 BLOOD TYPING SEROLOGIC ABO: CPT | Performed by: ANESTHESIOLOGY

## 2019-06-11 PROCEDURE — 37000008 ZZH ANESTHESIA TECHNICAL FEE, 1ST 30 MIN: Performed by: ORTHOPAEDIC SURGERY

## 2019-06-11 PROCEDURE — 25000125 ZZHC RX 250: Performed by: ORTHOPAEDIC SURGERY

## 2019-06-11 PROCEDURE — 71000012 ZZH RECOVERY PHASE 1 LEVEL 1 FIRST HR: Performed by: ORTHOPAEDIC SURGERY

## 2019-06-11 PROCEDURE — 40000170 ZZH STATISTIC PRE-PROCEDURE ASSESSMENT II: Performed by: ORTHOPAEDIC SURGERY

## 2019-06-11 PROCEDURE — 82962 GLUCOSE BLOOD TEST: CPT

## 2019-06-11 PROCEDURE — 25000128 H RX IP 250 OP 636: Performed by: NURSE ANESTHETIST, CERTIFIED REGISTERED

## 2019-06-11 PROCEDURE — 36000066 ZZH SURGERY LEVEL 4 W FLUORO 1ST 30 MIN - UMMC: Performed by: ORTHOPAEDIC SURGERY

## 2019-06-11 RX ORDER — LIDOCAINE 40 MG/G
CREAM TOPICAL
Status: DISCONTINUED | OUTPATIENT
Start: 2019-06-11 | End: 2019-06-11 | Stop reason: HOSPADM

## 2019-06-11 RX ORDER — BUPIVACAINE HYDROCHLORIDE AND EPINEPHRINE 5; 5 MG/ML; UG/ML
INJECTION, SOLUTION PERINEURAL PRN
Status: DISCONTINUED | OUTPATIENT
Start: 2019-06-11 | End: 2019-06-11 | Stop reason: HOSPADM

## 2019-06-11 RX ORDER — ONDANSETRON 2 MG/ML
4 INJECTION INTRAMUSCULAR; INTRAVENOUS EVERY 30 MIN PRN
Status: DISCONTINUED | OUTPATIENT
Start: 2019-06-11 | End: 2019-06-11 | Stop reason: HOSPADM

## 2019-06-11 RX ORDER — FENTANYL CITRATE 50 UG/ML
25-50 INJECTION, SOLUTION INTRAMUSCULAR; INTRAVENOUS
Status: DISCONTINUED | OUTPATIENT
Start: 2019-06-11 | End: 2019-06-11 | Stop reason: HOSPADM

## 2019-06-11 RX ORDER — SODIUM CHLORIDE, SODIUM LACTATE, POTASSIUM CHLORIDE, CALCIUM CHLORIDE 600; 310; 30; 20 MG/100ML; MG/100ML; MG/100ML; MG/100ML
INJECTION, SOLUTION INTRAVENOUS CONTINUOUS
Status: DISCONTINUED | OUTPATIENT
Start: 2019-06-11 | End: 2019-06-11 | Stop reason: HOSPADM

## 2019-06-11 RX ORDER — ONDANSETRON 4 MG/1
4 TABLET, ORALLY DISINTEGRATING ORAL EVERY 30 MIN PRN
Status: DISCONTINUED | OUTPATIENT
Start: 2019-06-11 | End: 2019-06-11 | Stop reason: HOSPADM

## 2019-06-11 RX ORDER — HYDROMORPHONE HYDROCHLORIDE 1 MG/ML
.3-.5 INJECTION, SOLUTION INTRAMUSCULAR; INTRAVENOUS; SUBCUTANEOUS EVERY 5 MIN PRN
Status: DISCONTINUED | OUTPATIENT
Start: 2019-06-11 | End: 2019-06-11 | Stop reason: HOSPADM

## 2019-06-11 RX ORDER — ACETAMINOPHEN 325 MG/1
650 TABLET ORAL
Status: DISCONTINUED | OUTPATIENT
Start: 2019-06-11 | End: 2019-06-11 | Stop reason: HOSPADM

## 2019-06-11 RX ORDER — OXYCODONE AND ACETAMINOPHEN 5; 325 MG/1; MG/1
1-2 TABLET ORAL EVERY 4 HOURS PRN
Qty: 20 TABLET | Refills: 0 | Status: SHIPPED | OUTPATIENT
Start: 2019-06-11 | End: 2019-06-11

## 2019-06-11 RX ORDER — ONDANSETRON 4 MG/1
4 TABLET, ORALLY DISINTEGRATING ORAL
Status: COMPLETED | OUTPATIENT
Start: 2019-06-11 | End: 2019-06-11

## 2019-06-11 RX ORDER — METHOCARBAMOL 750 MG/1
750 TABLET, FILM COATED ORAL
Status: COMPLETED | OUTPATIENT
Start: 2019-06-11 | End: 2019-06-11

## 2019-06-11 RX ORDER — GABAPENTIN 300 MG/1
300 CAPSULE ORAL
Status: COMPLETED | OUTPATIENT
Start: 2019-06-11 | End: 2019-06-11

## 2019-06-11 RX ORDER — MAGNESIUM HYDROXIDE 1200 MG/15ML
LIQUID ORAL PRN
Status: DISCONTINUED | OUTPATIENT
Start: 2019-06-11 | End: 2019-06-11 | Stop reason: HOSPADM

## 2019-06-11 RX ORDER — DEXAMETHASONE SODIUM PHOSPHATE 10 MG/ML
INJECTION, SOLUTION INTRAMUSCULAR; INTRAVENOUS PRN
Status: DISCONTINUED | OUTPATIENT
Start: 2019-06-11 | End: 2019-06-11

## 2019-06-11 RX ORDER — OXYCODONE HYDROCHLORIDE 5 MG/1
5 TABLET ORAL
Status: DISCONTINUED | OUTPATIENT
Start: 2019-06-11 | End: 2019-06-11 | Stop reason: HOSPADM

## 2019-06-11 RX ORDER — LIDOCAINE HYDROCHLORIDE 20 MG/ML
INJECTION, SOLUTION INFILTRATION; PERINEURAL PRN
Status: DISCONTINUED | OUTPATIENT
Start: 2019-06-11 | End: 2019-06-11

## 2019-06-11 RX ORDER — AMOXICILLIN 250 MG
1-2 CAPSULE ORAL 2 TIMES DAILY
Qty: 30 TABLET | Refills: 0 | Status: SHIPPED | OUTPATIENT
Start: 2019-06-11

## 2019-06-11 RX ORDER — FENTANYL CITRATE 50 UG/ML
INJECTION, SOLUTION INTRAMUSCULAR; INTRAVENOUS PRN
Status: DISCONTINUED | OUTPATIENT
Start: 2019-06-11 | End: 2019-06-11

## 2019-06-11 RX ORDER — CEFAZOLIN SODIUM 2 G/100ML
2 INJECTION, SOLUTION INTRAVENOUS
Status: COMPLETED | OUTPATIENT
Start: 2019-06-11 | End: 2019-06-11

## 2019-06-11 RX ORDER — NALOXONE HYDROCHLORIDE 0.4 MG/ML
.1-.4 INJECTION, SOLUTION INTRAMUSCULAR; INTRAVENOUS; SUBCUTANEOUS
Status: DISCONTINUED | OUTPATIENT
Start: 2019-06-11 | End: 2019-06-11 | Stop reason: HOSPADM

## 2019-06-11 RX ORDER — ONDANSETRON 2 MG/ML
INJECTION INTRAMUSCULAR; INTRAVENOUS PRN
Status: DISCONTINUED | OUTPATIENT
Start: 2019-06-11 | End: 2019-06-11

## 2019-06-11 RX ORDER — HYDROCODONE BITARTRATE AND ACETAMINOPHEN 5; 325 MG/1; MG/1
1-2 TABLET ORAL EVERY 4 HOURS PRN
Qty: 20 TABLET | Refills: 0 | Status: SHIPPED | OUTPATIENT
Start: 2019-06-11 | End: 2019-06-12

## 2019-06-11 RX ORDER — ONDANSETRON 4 MG/1
4-8 TABLET, ORALLY DISINTEGRATING ORAL EVERY 8 HOURS PRN
Qty: 4 TABLET | Refills: 0 | Status: SHIPPED | OUTPATIENT
Start: 2019-06-11

## 2019-06-11 RX ORDER — CEFAZOLIN SODIUM 1 G/3ML
1 INJECTION, POWDER, FOR SOLUTION INTRAMUSCULAR; INTRAVENOUS SEE ADMIN INSTRUCTIONS
Status: DISCONTINUED | OUTPATIENT
Start: 2019-06-11 | End: 2019-06-11 | Stop reason: HOSPADM

## 2019-06-11 RX ORDER — HYDROXYZINE HYDROCHLORIDE 25 MG/1
25 TABLET, FILM COATED ORAL
Status: COMPLETED | OUTPATIENT
Start: 2019-06-11 | End: 2019-06-11

## 2019-06-11 RX ORDER — PROPOFOL 10 MG/ML
INJECTION, EMULSION INTRAVENOUS PRN
Status: DISCONTINUED | OUTPATIENT
Start: 2019-06-11 | End: 2019-06-11

## 2019-06-11 RX ORDER — ACETAMINOPHEN 325 MG/1
975 TABLET ORAL ONCE
Status: COMPLETED | OUTPATIENT
Start: 2019-06-11 | End: 2019-06-11

## 2019-06-11 RX ADMIN — DEXAMETHASONE SODIUM PHOSPHATE 8 MG: 10 INJECTION, SOLUTION INTRAMUSCULAR; INTRAVENOUS at 14:00

## 2019-06-11 RX ADMIN — ONDANSETRON 4 MG: 2 INJECTION INTRAMUSCULAR; INTRAVENOUS at 15:31

## 2019-06-11 RX ADMIN — PROPOFOL 150 MG: 10 INJECTION, EMULSION INTRAVENOUS at 13:55

## 2019-06-11 RX ADMIN — ACETAMINOPHEN 650 MG: 325 TABLET, FILM COATED ORAL at 18:49

## 2019-06-11 RX ADMIN — ONDANSETRON 4 MG: 2 INJECTION INTRAMUSCULAR; INTRAVENOUS at 14:00

## 2019-06-11 RX ADMIN — LIDOCAINE HYDROCHLORIDE 100 MG: 20 INJECTION, SOLUTION INFILTRATION; PERINEURAL at 13:54

## 2019-06-11 RX ADMIN — PROPOFOL 50 MG: 10 INJECTION, EMULSION INTRAVENOUS at 13:57

## 2019-06-11 RX ADMIN — CEFAZOLIN SODIUM 2 G: 2 INJECTION, SOLUTION INTRAVENOUS at 14:00

## 2019-06-11 RX ADMIN — SUGAMMADEX 200 MG: 100 INJECTION, SOLUTION INTRAVENOUS at 15:00

## 2019-06-11 RX ADMIN — ONDANSETRON 4 MG: 4 TABLET, ORALLY DISINTEGRATING ORAL at 18:29

## 2019-06-11 RX ADMIN — FENTANYL CITRATE 50 MCG: 50 INJECTION INTRAMUSCULAR; INTRAVENOUS at 15:48

## 2019-06-11 RX ADMIN — SODIUM CHLORIDE, POTASSIUM CHLORIDE, SODIUM LACTATE AND CALCIUM CHLORIDE: 600; 310; 30; 20 INJECTION, SOLUTION INTRAVENOUS at 13:45

## 2019-06-11 RX ADMIN — FENTANYL CITRATE 25 MCG: 50 INJECTION INTRAMUSCULAR; INTRAVENOUS at 16:00

## 2019-06-11 RX ADMIN — FENTANYL CITRATE 50 MCG: 50 INJECTION, SOLUTION INTRAMUSCULAR; INTRAVENOUS at 15:08

## 2019-06-11 RX ADMIN — FENTANYL CITRATE 100 MCG: 50 INJECTION, SOLUTION INTRAMUSCULAR; INTRAVENOUS at 13:54

## 2019-06-11 RX ADMIN — HYDROMORPHONE HYDROCHLORIDE 0.5 MG: 1 INJECTION, SOLUTION INTRAMUSCULAR; INTRAVENOUS; SUBCUTANEOUS at 16:05

## 2019-06-11 RX ADMIN — METHOCARBAMOL 750 MG: 750 TABLET, FILM COATED ORAL at 18:51

## 2019-06-11 RX ADMIN — PROCHLORPERAZINE EDISYLATE 10 MG: 5 INJECTION INTRAMUSCULAR; INTRAVENOUS at 16:36

## 2019-06-11 RX ADMIN — HYDROXYZINE HYDROCHLORIDE 25 MG: 25 TABLET ORAL at 18:49

## 2019-06-11 RX ADMIN — FENTANYL CITRATE 50 MCG: 50 INJECTION, SOLUTION INTRAMUSCULAR; INTRAVENOUS at 14:26

## 2019-06-11 RX ADMIN — FENTANYL CITRATE 25 MCG: 50 INJECTION INTRAMUSCULAR; INTRAVENOUS at 15:35

## 2019-06-11 RX ADMIN — GABAPENTIN 300 MG: 300 CAPSULE ORAL at 13:03

## 2019-06-11 RX ADMIN — ACETAMINOPHEN 975 MG: 325 TABLET, FILM COATED ORAL at 13:03

## 2019-06-11 RX ADMIN — ROCURONIUM BROMIDE 50 MG: 10 INJECTION INTRAVENOUS at 13:56

## 2019-06-11 ASSESSMENT — MIFFLIN-ST. JEOR: SCORE: 1725.63

## 2019-06-11 NOTE — ANESTHESIA POSTPROCEDURE EVALUATION
Anesthesia POST Procedure Evaluation    Patient: Reinae J Bollschweiler   MRN:     [id number] Gender:   female   Age:    43 year old :      1975        Preoperative Diagnosis: Coccygodynia   Procedure(s):  Coccygectomy   Postop Comments: No value filed.       Anesthesia Type:  General  No value filed.    Reportable Event: NO     PAIN: Uncomplicated   Sign Out status: Comfortable, Well controlled pain     PONV: No PONV   Sign Out status:  No Nausea or Vomiting     Neuro/Psych: Uneventful perioperative course   Sign Out Status: Preoperative baseline; Age appropriate mentation     Airway/Resp.: Uneventful perioperative course   Sign Out Status: Non labored breathing, age appropriate RR; Resp. Status within EXPECTED Parameters     CV: Uneventful perioperative course   Sign Out status: Appropriate BP and perfusion indices; Appropriate HR/Rhythm     Disposition:   Sign Out in:  PACU  Disposition:  Phase II; Home  Recovery Course: Uneventful  Follow-Up: Not required           Last Anesthesia Record Vitals:  [facility] VITALS  2019 1435 - 2019 1535      2019             Resp Rate (observed):  16          Last PACU Vitals:  Vitals Value Taken Time   /67 2019  5:20 PM   Temp 36.6  C (97.9  F) 2019  3:30 PM   Pulse 90 2019  5:20 PM   Resp 15 2019  5:00 PM   SpO2 98 % 2019  5:25 PM   Temp src     NIBP     Pulse     SpO2     Resp     Temp     Ht Rate     Temp 2     Vitals shown include unvalidated device data.      Electronically Signed By: Kayode Pugh MD, 2019, 5:26 PM

## 2019-06-11 NOTE — OR NURSING
Pt's transport home, Lazaro-mom, not present in preop room.  Lazaro called and confirmed transport.  [facility]  Lazaro informed to keep her cell phone available for updates.

## 2019-06-11 NOTE — BRIEF OP NOTE
Brief Operative Note    Preop Dx:   Coccygodynia  Post op Dx:   same  Procedure:    Procedure(s):  Coccygectomy  Surgeon:     Chandler Hart MD  Assistants:    Rico Souza PA-C  Anesthesia:   General  EBL:    <5 ml  Total IV Fluids:   See Anesthesia Record  Specimens:   None  Findings:   See Operative Dictation      Assessment and Plan: Reinae J Bollschweiler is a 43 year old female with PMH including bladder repair and hysterectomy now s/p coccygectomy on 6/11/19 with Dr. Hart.     [facility]  Activity: Up with assist until independent. No excessive bending or twisting. No lifting >10 lbs x 6 weeks.  Weight bearing status: WBAT.  Pain management: Percocet, then Tylenol when weaning  Antibiotics: None.  Diet: Begin with clear fluids and progress diet as tolerated.   DVT prophylaxis: Ambulation only. No chemical DVT ppx needed.  Imaging: None.  Labs: None.  Bracing/Splinting: None.  Dressings: Surgical glue and small dressing.  Drains: None.  Sweeney catheter: None.   Physical Therapy/Occupational Therapy: None.  Cultures: None.    Consults: None.  Follow-up: Clinic with Dr. Hart in 6 weeks with repeat x-rays.   Disposition: Pending progress with therapies, pain control on orals, and medical stability, anticipate discharge to home on POD #0.

## 2019-06-11 NOTE — OP NOTE
Date of Service:  6/11/2019       Surgeon:  Chandler Hart MD   Assistant:  Rico Souza PA-C.  No resident available.      Preoperative Diagnosis:  Coccygodynia      Postoperative Diagnosis:  Coccygodynia        Procedures:   Coccygectomy      Anesthesia:  General endotracheal.   Local anesthetic:  0.5% marcaine + epinephrine = 30 mL.  EBL:  5 mL.  Complications:  None apparent.       Indications:  43 year old female with chronic tailbone pain.  Imaging revealed abnormally shaped coccyx with mobile distal fragment.  Tried nonoperative treatment, continues to have significant disabling symptoms.  Sacrococcygeal injection gave excellent temporary relief.   I thus offered surgery in form of coccygectomy.  Consented after thorough discussion of the rationale, risks, benefits and alternatives.        Details:  Properly identified in preop area, site marked, informed consent signed.  Wheeled to OR.  Brief earlier performed.  General anesthesia administered.  No Sweeney.  Antibiotic given: Ancef 2gm IV.  Positioned prone on Trios with combo pads.  I performed a digital rectal examination and grasped the tip of the coccyx between my thumb outside and my index finger inside rectum.  I was able to appreciate motion at the sacrococcygeal joint; cannot tell if this motion was abnormal.  I marked planned skin incision approximately 0.3 cm to right of midline, overlying the coccyx.  C-arm was taken off the field.  Operative area was squared off and prepped with ChloraPrep and draped in sterile fashion.  Surgical timeout was performed.       I made a 3 cm skin incision just to the right of midline centered over the coccyx.  Incision carried down directly to bone.  I used cautery to dissect all the way around the coccygeal fragment and to detach the coccyx from the sacrum at the sacrococcygeal joint level.  Ventrally, I used a Medina periosteal elevator to lift the coccyx from the underlying soft tissue, making sure not to plunge  deep ventrally.  We were able to resect the coccyx.  Good hemostasis achieved using bipolar.  Irrigation.  Piece of Gelfoam placed over the coccyx bed.  We closed the periosteum over this using interrupted Vicryl 0.  Local anesthetic injected.  Subcutaneous tissue closed with 2-0 Vicryl and skin with 3-0 Monocryl.  Exofin skin glue and Primapore dressing applied.       The patient tolerated the procedure well, taken off general anesthetic, transferred to recovery room in satisfactory condition.       Postop:  Discharge today after brief PACU stay, with oral pain medication; advised regarding wound care.  Return to clinic in 6 weeks for wound check.

## 2019-06-11 NOTE — DISCHARGE INSTRUCTIONS
Take it easy when you get home.  Remember, same day surgery DOES NOT MEAN SAME DAY RECOVERY!  Healing is a gradual process.  You will need some time to recover - you may be more tired than you realize at first.  Rest and relax for at least the first 24 hours at home.  You'll feel better and heal faster if you take good care of yourself.           Tips for taking pain medications  To get the best pain relief possible , remember these points:      Take pain medications as directed, before pain becomes severe      Pain medication can upset your stomach: taking it with food may help      Constipation is a common side effect of pain medication. Drink plenty of  Fluids      Eat foods high in fiber. Take a stool softener  if recommended by your doctor or  Pharmacist.        Do not drink alcohol, drive or operate machinery while taking pain medications.      Ask about other ways to control pain, such as with heat, ice or relaxation.    [facility], [place]  Same-Day Surgery   Adult Discharge Orders & Instructions     For 24 hours after surgery    1. Get plenty of rest.  A responsible adult must stay with you for at least 24 hours after you leave the hospital.   2. Do not drive or use heavy equipment.  If you have weakness or tingling, don't drive or use heavy equipment until this feeling goes away.  3. Do not drink alcohol.  4. Avoid strenuous or risky activities.  Ask for help when climbing stairs.   5. You may feel lightheaded.  IF so, sit for a few minutes before standing.  Have someone help you get up.   6. If you have nausea (feel sick to your stomach): Drink only clear liquids such as apple juice, ginger ale, broth or 7-Up.  Rest may also help.  Be sure to drink enough fluids.  Move to a regular diet as you feel able.  7. You may have a slight fever. Call the doctor if your fever is over 100 F (37.7 C) (taken under the tongue) or lasts longer than 24 hours.  8. You may have a dry mouth, a  sore throat, muscle aches or trouble sleeping.  These should go away after 24 hours.  9. Do not make important or legal decisions.   Call your doctor for any of the followin.  Signs of infection (fever, growing tenderness at the surgery site, a large amount of drainage or bleeding, severe pain, foul-smelling drainage, redness, swelling).    2. It has been over 8 to 10 hours since surgery and you are still not able to urinate (pass water).    3.  Headache for over 24 hours.      To contact a doctor, call Dr. Hart @ [phone] ([facility])  or:        [phone] and ask for the resident on call for          [facility]  (answered 24 hours a day)- at night or on the weekend      Emergency Department:    [facility]: [phone]       (TTY for hearing impaired: [phone])

## 2019-06-11 NOTE — ANESTHESIA CARE TRANSFER NOTE
Patient: Reinae J Bollschweiler    Procedure(s):  Coccygectomy    Diagnosis: Coccygodynia  Diagnosis Additional Information: No value filed.    Anesthesia Type:   No value filed.     Note:  Airway :Nasal Cannula  Patient transferred to:PACU  Handoff Report: Identifed the Patient, Identified the Reponsible Provider, Reviewed the pertinent medical history, Discussed the surgical course, Reviewed Intra-OP anesthesia mangement and issues during anesthesia, Set expectations for post-procedure period and Allowed opportunity for questions and acknowledgement of understanding      Vitals: (Last set prior to Anesthesia Care Transfer)    CRNA VITALS  6/11/2019 1435 - 6/11/2019 1510      6/11/2019             Resp Rate (observed):  16                Electronically Signed By: PANCHO Hurtado CRNA  June 11, 2019  3:10 PM

## 2019-06-11 NOTE — ANESTHESIA PREPROCEDURE EVALUATION
"Anesthesia Pre-Procedure Evaluation    Patient: Reinae J Bollschweiler   MRN:     [id number] Gender:   female   Age:    43 year old :      1975        Preoperative Diagnosis: Coccygodynia   Procedure(s):  Coccygectomy     No past medical history on file.   Past Surgical History:   Procedure Laterality Date     CHOLECYSTECTOMY       GYN SURGERY      D and C     ORTHOPEDIC SURGERY Left     knee surgery     TONSILLECTOMY            Anesthesia Evaluation     . Pt has had prior anesthetic. Type: General    No history of anesthetic complications          ROS/MED HX    ENT/Pulmonary:       Neurologic:       Cardiovascular:         METS/Exercise Tolerance:     Hematologic:         Musculoskeletal:         GI/Hepatic:         Renal/Genitourinary:         Endo:         Psychiatric:         Infectious Disease:         Malignancy:         Other:    (+) H/O Chronic Pain,                       PHYSICAL EXAM:   Mental Status/Neuro: A/A/O   Airway: Facies: Feasible  Mallampati: II  Mouth/Opening: Full  TM distance: > 6 cm  Neck ROM: Full   Respiratory: Auscultation: CTAB     Resp. Rate: Normal     Resp. Effort: Normal      CV: Rhythm: Regular  Rate: Age appropriate  Heart: Normal Sounds   Comments:      Dental: Normal                  Lab Results   Component Value Date    HGB 9.6 (L) 2018       Preop Vitals  BP Readings from Last 3 Encounters:   19 121/89    Pulse Readings from Last 3 Encounters:   19 90      Resp Readings from Last 3 Encounters:   19 16    SpO2 Readings from Last 3 Encounters:   19 97%      Temp Readings from Last 1 Encounters:   19 36.8  C (98.2  F) (Oral)    Ht Readings from Last 1 Encounters:   19 1.702 m (5' 7\")      Wt Readings from Last 1 Encounters:   19 104.3 kg (229 lb 15 oz)    Estimated body mass index is 36.01 kg/m  as calculated from the following:    Height as of this encounter: 1.702 m (5' 7\").    Weight as of this encounter: 104.3 kg (229 lb " 15 oz).     LDA:            Assessment:   ASA SCORE: 3    NPO Status: > 6 hours since completed Solid Foods   Documentation: H&P complete; Preop Testing complete; Consents complete   Proceeding: Proceed without further delay  Tobacco Use:  NO Active use of Tobacco/UNKNOWN Tobacco use status     Plan:   Anes. Type:  General   Pre-Induction: Midazolam IV   Induction:  IV (Standard)   Airway: Oral ETT   Access/Monitoring: PIV   Maintenance: Balanced   Emergence: Procedure Site   Logistics: Same Day Surgery     Postop Pain/Sedation Strategy:  Standard-Options: Opioids PRN     PONV Management:  Adult Risk Factors: Female, Non-Smoker, Postop Opioids  Prevention: Ondansetron; Dexamethasone     CONSENT: Direct conversation   Plan and risks discussed with: Patient   Blood Products: Consented (ALL Blood Products)                         Joe Negro MD

## 2019-06-11 NOTE — OR NURSING
Dr. Ulrich paged at this time and on phone. Will change patient RX to Norco from Oxycodone but will need attending to sign RX prior to patient discharge.

## 2019-06-12 ENCOUNTER — TELEPHONE (OUTPATIENT)
Dept: ORTHOPEDICS | Facility: CLINIC | Age: 44
End: 2019-06-12

## 2019-06-12 DIAGNOSIS — M53.3 COCCYGODYNIA: ICD-10-CM

## 2019-06-12 RX ORDER — HYDROCODONE BITARTRATE AND ACETAMINOPHEN 5; 325 MG/1; MG/1
1-2 TABLET ORAL EVERY 4 HOURS PRN
Qty: 90 TABLET | Refills: 0 | Status: SHIPPED | OUTPATIENT
Start: 2019-06-12

## 2019-06-12 NOTE — TELEPHONE ENCOUNTER
[facility] Call Center    Phone Message    May a detailed message be left on voicemail: yes    Reason for Call: Medication Refill Request    Has the patient contacted the pharmacy for the refill? Yes   Name of medication being requested: HYDROcodone-acetaminophen (NORCO) 5-325 MG tablet  Provider who prescribed the medication: Dr. Hart  Pharmacy: [facility]  Date medication is needed: ASAP-pt is leaving for [place] soon, and was under the impression that she was going to get a hard copy of this med to take to her pharmacy. She states she was told if she knew she would need more, to call right away. She is wanting to know what the plan is. Please advise.    Action Taken: Message routed to:  [facility] Ortho.    See phone message.  I called pt back.  Only got #20 Norco postop & driving back to [place].  Mailed refill of Nelson to pt.   Call back prn. Pt agreed.  S.O./Honey Maki RN.

## 2019-07-22 NOTE — PROGRESS NOTES
Reason for Visit:  Chief Complaint   Patient presents with     Surgical Followup     DOS 6/11/19 S/P Coccygectomy       S>  43/f, 6 wks s/p coccygectomy; 1st postop visit.    Unaccompanied.  Doing excellent.  Very happy with surgery.  Preop sxs resolved.  No complaints.  Off pain meds.      Oswestry (DEANNE) Questionnaire    OSWESTRY DISABILITY INDEX 7/23/2019   Count 9   Sum 1   Oswestry Score (%) 2.22      Preop DEANNE = 15%  6 wks = 2.22%       O>   Alert, oriented x 3, cooperative.  Not in CP distress.  There were no vitals taken for this visit.  Surgical incision well-healed, no sign of infection.  Ambulates independently.   Grossly neurologically intact both LE's    Imaging:   No x-rays today.    A>  - 6 wks postop, doing excellent    P>   Congratulated and reassured patient.  Am very happy the surgery worked very well for her.  May now get wound soaked as it is fully healed.  May advance to full activities gradually as tolerated.  Has upcoming appt in 6 wks, which I told her she may cancel if she is doing well at the time.  RTC prn.        Chandler Hart MD    Orthopaedic Spine Surgery  Dept Orthopaedic Surgery, [facility]  [phone] office, [phone] pager  [email or web address]

## 2019-07-23 ENCOUNTER — OFFICE VISIT (OUTPATIENT)
Dept: ORTHOPEDICS | Facility: CLINIC | Age: 44
End: 2019-07-23
Payer: COMMERCIAL

## 2019-07-23 DIAGNOSIS — Z98.890 S/P SPINAL SURGERY: Primary | ICD-10-CM

## 2019-07-23 RX ORDER — MUPIROCIN 20 MG/G
OINTMENT TOPICAL
Refills: 0 | COMMUNITY
Start: 2019-07-18

## 2019-07-23 RX ORDER — CEPHALEXIN 500 MG/1
CAPSULE ORAL
Refills: 0 | COMMUNITY
Start: 2019-07-18

## 2019-07-23 ASSESSMENT — ENCOUNTER SYMPTOMS
ARTHRALGIAS: 0
MUSCLE WEAKNESS: 0
MYALGIAS: 0
BACK PAIN: 0
SKIN CHANGES: 0
POOR WOUND HEALING: 0
NAIL CHANGES: 0
STIFFNESS: 0
MUSCLE CRAMPS: 0
JOINT SWELLING: 0

## 2019-07-23 NOTE — LETTER
7/23/2019       RE: Reinae J Bollschweiler  [address]  [facility] [address]     Dear Colleague,    Thank you for referring your patient, Reinae J Bollschweiler, to the [facility]. Please see a copy of my visit note below.    Reason for Visit:  Chief Complaint   Patient presents with     Surgical Followup     DOS 6/11/19 S/P Coccygectomy       S>  43/f, 6 wks s/p coccygectomy; 1st postop visit.    Unaccompanied.  Doing excellent.  Very happy with surgery.  Preop sxs resolved.  No complaints.  Off pain meds.    Oswestry (DEANNE) Questionnaire    OSWESTRY DISABILITY INDEX 7/23/2019   Count 9   Sum 1   Oswestry Score (%) 2.22      Preop DEANNE = 15%  6 wks = 2.22%     O>   Alert, oriented x 3, cooperative.  Not in CP distress.  There were no vitals taken for this visit.  Surgical incision well-healed, no sign of infection.  Ambulates independently.   Grossly neurologically intact both LE's    Imaging:   No x-rays today.    A>  - 6 wks postop, doing excellent    P>   Congratulated and reassured patient.  Am very happy the surgery worked very well for her.  May now get wound soaked as it is fully healed.  May advance to full activities gradually as tolerated.  Has upcoming appt in 6 wks, which I told her she may cancel if she is doing well at the time.  RTC prn.    Chandler Hart MD    Orthopaedic Spine Surgery  Dept Orthopaedic Surgery, [facility]  [phone] office, [phone] pager  [email or web address]

## 2019-07-23 NOTE — NURSING NOTE
Reason For Visit:   Chief Complaint   Patient presents with     Surgical Followup     DOS 6/11/19 S/P Coccygectomy       Primary MD: No primary care provider on file.  Ref. MD: Dr. Thakur           ?  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  Work status?  Full time.  Date of injury: Pt states she doesn't remember ever injuring it. Pain started in 2013 when she was pregnant. Pain has gotten worse over time. 2015 She took a fall right onto her coccyx which increased pain even more.   Date of surgery: Previous History of Injections.   Type of surgery: NA  Smoker: No       Pain Assessment  Patient Currently in Pain: Denies    Oswestry (DEANNE) Questionnaire    OSWESTRY DISABILITY INDEX 7/23/2019   Count 9   Sum 1   Oswestry Score (%) 2.22        Visual Analog Pain Scale  Back Pain Scale 0-10: 0  Right leg pain: 0  Left leg pain: 0    Promis 10 Assessment    PROMIS 10 7/23/2019   In general, would you say your health is: Good   In general, would you say your quality of life is: Very good   In general, how would you rate your physical health? Good   In general, how would you rate your mental health, including your mood and your ability to think? Good   In general, how would you rate your satisfaction with your social activities and relationships? Very good   In general, please rate how well you carry out your usual social activities and roles Very good   To what extent are you able to carry out your everyday physical activities such as walking, climbing stairs, carrying groceries, or moving a chair? Mostly   How often have you been bothered by emotional problems such as feeling anxious, depressed or irritable? Never   How would you rate your fatigue on average? Mild   How would you rate your pain on average?   0 = No Pain  to  10 = Worst Imaginable Pain 3   In general, would you say your health is: 3   In general, would you say your quality of life is: 4   In general, how would you rate your physical health?  3   In general, how would you rate your mental health, including your mood and your ability to think? 3   In general, how would you rate your satisfaction with your social activities and relationships? 4   In general, please rate how well you carry out your usual social activities and roles. (This includes activities at home, at work and in your community, and responsibilities as a parent, child, spouse, employee, friend, etc.) 4   To what extent are you able to carry out your everyday physical activities such as walking, climbing stairs, carrying groceries, or moving a chair? 4   In the past 7 days, how often have you been bothered by emotional problems such as feeling anxious, depressed, or irritable? 1   In the past 7 days, how would you rate your fatigue on average? 2   In the past 7 days, how would you rate your pain on average, where 0 means no pain, and 10 means worst imaginable pain? 3   Global Mental Health Score 16   Global Physical Health Score 15   PROMIS TOTAL - SUBSCORES 31                Nan Del Toro LPN

## 2020-03-11 ENCOUNTER — HEALTH MAINTENANCE LETTER (OUTPATIENT)
Age: 45
End: 2020-03-11

## 2020-12-27 ENCOUNTER — HEALTH MAINTENANCE LETTER (OUTPATIENT)
Age: 45
End: 2020-12-27

## 2021-03-06 ENCOUNTER — HEALTH MAINTENANCE LETTER (OUTPATIENT)
Age: 46
End: 2021-03-06

## 2021-04-25 ENCOUNTER — HEALTH MAINTENANCE LETTER (OUTPATIENT)
Age: 46
End: 2021-04-25

## 2021-07-12 ENCOUNTER — APPOINTMENT (RX ONLY)
Dept: URBAN - METROPOLITAN AREA CLINIC 17 | Facility: CLINIC | Age: 46
Setting detail: DERMATOLOGY
End: 2021-07-12

## 2021-07-12 DIAGNOSIS — Z71.89 OTHER SPECIFIED COUNSELING: ICD-10-CM

## 2021-07-12 DIAGNOSIS — L81.1 CHLOASMA: ICD-10-CM

## 2021-07-12 DIAGNOSIS — L57.3 POIKILODERMA OF CIVATTE: ICD-10-CM

## 2021-07-12 PROBLEM — D23.72 OTHER BENIGN NEOPLASM OF SKIN OF LEFT LOWER LIMB, INCLUDING HIP: Status: ACTIVE | Noted: 2021-07-12

## 2021-07-12 PROCEDURE — ? COUNSELING

## 2021-07-12 PROCEDURE — 99203 OFFICE O/P NEW LOW 30 MIN: CPT

## 2021-07-12 ASSESSMENT — LOCATION SIMPLE DESCRIPTION DERM
LOCATION SIMPLE: LEFT CHEEK
LOCATION SIMPLE: LEFT ANTERIOR NECK
LOCATION SIMPLE: RIGHT CHEEK
LOCATION SIMPLE: RIGHT ANTERIOR NECK

## 2021-07-12 ASSESSMENT — LOCATION DETAILED DESCRIPTION DERM
LOCATION DETAILED: RIGHT INFERIOR CENTRAL MALAR CHEEK
LOCATION DETAILED: LEFT INFERIOR CENTRAL MALAR CHEEK
LOCATION DETAILED: LEFT INFERIOR ANTERIOR NECK
LOCATION DETAILED: LEFT INFERIOR LATERAL NECK
LOCATION DETAILED: RIGHT INFERIOR LATERAL NECK

## 2021-07-12 ASSESSMENT — LOCATION ZONE DERM
LOCATION ZONE: NECK
LOCATION ZONE: FACE

## 2021-10-09 ENCOUNTER — HEALTH MAINTENANCE LETTER (OUTPATIENT)
Age: 46
End: 2021-10-09

## 2022-03-26 ENCOUNTER — HEALTH MAINTENANCE LETTER (OUTPATIENT)
Age: 47
End: 2022-03-26

## 2022-05-21 ENCOUNTER — HEALTH MAINTENANCE LETTER (OUTPATIENT)
Age: 47
End: 2022-05-21

## 2022-09-17 ENCOUNTER — HEALTH MAINTENANCE LETTER (OUTPATIENT)
Age: 47
End: 2022-09-17

## 2023-05-06 ENCOUNTER — HEALTH MAINTENANCE LETTER (OUTPATIENT)
Age: 48
End: 2023-05-06

## 2023-06-04 ENCOUNTER — HEALTH MAINTENANCE LETTER (OUTPATIENT)
Age: 48
End: 2023-06-04

## (undated) DEVICE — GLOVE PROTEXIS W/NEU-THERA 8.5  2D73TE85

## (undated) DEVICE — PACK NEURO MINOR UMMC SNE32MNMU4

## (undated) DEVICE — SU VICRYL 0 CT-1 CR 8X18" J740D

## (undated) DEVICE — ESU ELEC BLADE 2.75" COATED/INSULATED E1455

## (undated) DEVICE — GLOVE PROTEXIS BLUE W/NEU-THERA 8.5  2D73EB85

## (undated) DEVICE — LABEL MEDICATION SYSTEM 3303-P

## (undated) DEVICE — KIT PATIENT CARE JACKSON SPINE PACK 5808PV

## (undated) DEVICE — GLOVE PROTEXIS MICRO 8.0  2D73PM80

## (undated) DEVICE — ADH SKIN CLOSURE PREMIERPRO EXOFIN 1.0ML 3470

## (undated) DEVICE — ESU GROUND PAD UNIVERSAL W/O CORD

## (undated) DEVICE — LINEN TOWEL PACK X30 5481

## (undated) DEVICE — DRSG PRIMAPORE 03 1/8X6" 66000318

## (undated) DEVICE — LINEN TOWEL PACK X6 WHITE 5487

## (undated) DEVICE — SOL WATER IRRIG 1000ML BOTTLE 2F7114

## (undated) DEVICE — SPONGE COTTONOID 1/2X1/2" 20-04S

## (undated) DEVICE — SU MONOCRYL 3-0 PS-1 27" Y936H

## (undated) DEVICE — SUCTION MANIFOLD DORNOCH ULTRA CART UL-CL500

## (undated) DEVICE — SU VICRYL 2-0 CT-2 CR 8X18" J726D

## (undated) RX ORDER — ACETAMINOPHEN 325 MG/1
TABLET ORAL
Status: DISPENSED
Start: 2019-06-11

## (undated) RX ORDER — HYDROXYZINE HYDROCHLORIDE 25 MG/1
TABLET, FILM COATED ORAL
Status: DISPENSED
Start: 2019-06-11

## (undated) RX ORDER — GABAPENTIN 300 MG/1
CAPSULE ORAL
Status: DISPENSED
Start: 2019-06-11

## (undated) RX ORDER — FENTANYL CITRATE 50 UG/ML
INJECTION, SOLUTION INTRAMUSCULAR; INTRAVENOUS
Status: DISPENSED
Start: 2019-06-11

## (undated) RX ORDER — BUPIVACAINE HYDROCHLORIDE AND EPINEPHRINE 5; 5 MG/ML; UG/ML
INJECTION, SOLUTION EPIDURAL; INTRACAUDAL; PERINEURAL
Status: DISPENSED
Start: 2019-06-11

## (undated) RX ORDER — PROPOFOL 10 MG/ML
INJECTION, EMULSION INTRAVENOUS
Status: DISPENSED
Start: 2019-06-11

## (undated) RX ORDER — ONDANSETRON 2 MG/ML
INJECTION INTRAMUSCULAR; INTRAVENOUS
Status: DISPENSED
Start: 2019-06-11

## (undated) RX ORDER — ONDANSETRON 4 MG/1
TABLET, ORALLY DISINTEGRATING ORAL
Status: DISPENSED
Start: 2019-06-11

## (undated) RX ORDER — SODIUM CHLORIDE, SODIUM LACTATE, POTASSIUM CHLORIDE, CALCIUM CHLORIDE 600; 310; 30; 20 MG/100ML; MG/100ML; MG/100ML; MG/100ML
INJECTION, SOLUTION INTRAVENOUS
Status: DISPENSED
Start: 2019-06-11

## (undated) RX ORDER — CEFAZOLIN SODIUM 2 G/100ML
INJECTION, SOLUTION INTRAVENOUS
Status: DISPENSED
Start: 2019-06-11

## (undated) RX ORDER — HYDROMORPHONE HYDROCHLORIDE 1 MG/ML
INJECTION, SOLUTION INTRAMUSCULAR; INTRAVENOUS; SUBCUTANEOUS
Status: DISPENSED
Start: 2019-06-11

## (undated) RX ORDER — LIDOCAINE HYDROCHLORIDE 20 MG/ML
INJECTION, SOLUTION EPIDURAL; INFILTRATION; INTRACAUDAL; PERINEURAL
Status: DISPENSED
Start: 2019-06-11